# Patient Record
Sex: FEMALE | Race: WHITE | NOT HISPANIC OR LATINO | URBAN - NONMETROPOLITAN AREA
[De-identification: names, ages, dates, MRNs, and addresses within clinical notes are randomized per-mention and may not be internally consistent; named-entity substitution may affect disease eponyms.]

---

## 2019-02-20 NOTE — PATIENT DISCUSSION
The patient has developed an ACUTE HEMORRHAGIC PVD. No holes or tears were seen on clinical exam today. Reviewed the signs and symptoms of retinal tear/retinal detachment and the importance calling for prompt evaluation should there be increasing floaters, new flashing lights, or decreasing peripheral vision in either eye at any time. Close follow up is recommended due to higher risk or a tear in a hemorrhagic PVD.

## 2020-07-16 ENCOUNTER — IMPORTED ENCOUNTER (OUTPATIENT)
Dept: URBAN - NONMETROPOLITAN AREA CLINIC 1 | Facility: CLINIC | Age: 80
End: 2020-07-16

## 2020-07-16 PROBLEM — E11.9: Noted: 2020-07-16

## 2020-07-16 PROBLEM — Z96.1: Noted: 2020-07-16

## 2020-07-16 PROBLEM — H35.363: Noted: 2020-07-16

## 2020-07-16 PROCEDURE — 92250 FUNDUS PHOTOGRAPHY W/I&R: CPT

## 2020-07-16 PROCEDURE — 99204 OFFICE O/P NEW MOD 45 MIN: CPT

## 2020-07-16 NOTE — PATIENT DISCUSSION
NIDDM s  OU-Stressed the importance of keeping blood sugars under control blood pressure under control and weight normalization and regular visits with PCP. -Explained the possible effects of poorly controlled diabetes and the damage that diabetes can cause to ocular health. -Patient to check HgbA1C.-Pt instructed to contact our office with any vision changes. Pan-Macular Drusen OU - Recommend observation.- Recommend OCT-Mac in one year when patient returns. ***Recommend patient return for refraction

## 2020-07-29 ENCOUNTER — IMPORTED ENCOUNTER (OUTPATIENT)
Dept: URBAN - NONMETROPOLITAN AREA CLINIC 1 | Facility: CLINIC | Age: 80
End: 2020-07-29

## 2020-07-29 PROCEDURE — 92015 DETERMINE REFRACTIVE STATE: CPT

## 2020-11-12 NOTE — PATIENT DISCUSSION
[address] [address]  Phone: [phone]  Fax: [phone]    ANDIA Clarke CNP        November 12, 2020     Patient: Travis Guevara   YOB: 2006   Date of Visit: 11/12/2020       To Whom it May Concern:    James Jeong was seen in my clinic on 11/12/2020. He Is to be excused from work/school until COVID-19 results are finalized within the next 3-7 days. If you have any questions or concerns, please don't hesitate to call.     Sincerely,     NADIA Clarke CNP Cautioned may occur in other eye in future.

## 2021-07-20 ENCOUNTER — IMPORTED ENCOUNTER (OUTPATIENT)
Dept: URBAN - NONMETROPOLITAN AREA CLINIC 1 | Facility: CLINIC | Age: 81
End: 2021-07-20

## 2021-07-20 ENCOUNTER — PREPPED CHART (OUTPATIENT)
Dept: RURAL CLINIC 3 | Facility: CLINIC | Age: 81
End: 2021-07-20

## 2021-07-20 PROBLEM — H52.4: Noted: 2021-07-20

## 2021-07-20 PROBLEM — H35.3131: Noted: 2021-07-20

## 2021-07-20 PROBLEM — Z96.1: Noted: 2020-07-16

## 2021-07-20 PROBLEM — E11.9: Noted: 2020-07-16

## 2021-07-20 PROCEDURE — 92250 FUNDUS PHOTOGRAPHY W/I&R: CPT

## 2021-07-20 PROCEDURE — 92015 DETERMINE REFRACTIVE STATE: CPT

## 2021-07-20 PROCEDURE — 92014 COMPRE OPH EXAM EST PT 1/>: CPT

## 2021-07-20 NOTE — PATIENT DISCUSSION
NIDDM sans RetinopathyDiscussed diagnosis with patient. Discussed the risk of diabetic damage of the retina with potential vision loss and the importance of routine follow-up. Emphasized strict blood sugar control. Photos done today; no BDR OU. Continue to monitor. ARMD OUDiscussed diagnosis with patient. Discussed the chronic nature of this disease and limited treatment options. Recommend taking eye vitamins daily and monitoring vision for changes with [employer] Grid samples of Preservision given today. Continue to monitor. RTC in 6 months with OCT P/C IOL OUDiscussed diagnosis in detail with patient. Both intraocular implants in place and stable. Continue to monitor. Presbyopia OUDiscussed refracitve status in detail with patient. New glasses Rx given today. Continue to monitor.; 's Notes: MR  7/20/21DFE  7/20/21Photos  7/20/21OCT

## 2021-07-20 NOTE — PATIENT DISCUSSION
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 Discussed the risk of diabetic damage of the retina with potential vision loss and the importance of routine follow-up. Emphasized strict blood sugar control. Photos done today; no BDR OU. ARMD OU discussed diagnosis with patient. Discussed the chronic nature of this disease and limited treatment options. Recommend taking eye vitamins daily and monitoring vision for changes with Amsler Grid, samples of Preservision given today. RTC in 6 months with OCT. P/C IOL OU discussed diagnosis in detail with patient. Both intaocular implants in place and stable. Presbyopia OU discussed refractive status in detail with patient. New glasses rx given today.

## 2021-08-04 NOTE — PATIENT DISCUSSION
Does NOT APPEAR VISUALLY SIGNIFICANT. Plastic Surgeon Procedure Text (A): After obtaining clear surgical margins the patient was sent to plastics for surgical repair.  The patient understands they will receive post-surgical care and follow-up from the referring physician's office.

## 2022-01-23 ASSESSMENT — VISUAL ACUITY
OD_SC: 20/50
OS_SC: 20/50

## 2022-01-23 ASSESSMENT — TONOMETRY
OS_IOP_MMHG: 17
OD_IOP_MMHG: 17

## 2022-01-25 ENCOUNTER — FOLLOW UP (OUTPATIENT)
Dept: RURAL CLINIC 3 | Facility: CLINIC | Age: 82
End: 2022-01-25

## 2022-01-25 DIAGNOSIS — H35.3131: ICD-10-CM

## 2022-01-25 PROCEDURE — 92134 CPTRZ OPH DX IMG PST SGM RTA: CPT

## 2022-01-25 PROCEDURE — 99214 OFFICE O/P EST MOD 30 MIN: CPT

## 2022-01-25 ASSESSMENT — VISUAL ACUITY
OD_CC: 20/30-2
OS_CC: 20/25-1

## 2022-01-25 ASSESSMENT — TONOMETRY
OS_IOP_MMHG: 16
OD_IOP_MMHG: 16

## 2022-01-25 NOTE — PATIENT DISCUSSION
Discussed diagnosis with patient. Discussed the chronic nature of this disease and limited treatment options. Continue taking eye vitamins daily and monitoring vision for changes with Amsler Grid. OCT done today; no subretinal fluid OU. Continue to monitor. RTC in 6 months with Photos.

## 2022-01-25 NOTE — PATIENT DISCUSSION
Discussed diagnosis with patient. Discussed the risk of diabetic damage of the retina with potential vision loss and the importance of routine follow-up. Emphasized strict blood sugar control.

## 2022-04-16 ASSESSMENT — TONOMETRY
OD_IOP_MMHG: 16
OD_IOP_MMHG: 17
OS_IOP_MMHG: 17
OS_IOP_MMHG: 17

## 2022-04-16 ASSESSMENT — VISUAL ACUITY
OD_PH: 20/25+2
OD_CC: 20/50
OS_CC: 20/50
OS_PH: 20/20

## 2022-06-28 ENCOUNTER — FOLLOW UP (OUTPATIENT)
Dept: RURAL CLINIC 3 | Facility: CLINIC | Age: 82
End: 2022-06-28

## 2022-06-28 DIAGNOSIS — H35.3131: ICD-10-CM

## 2022-06-28 DIAGNOSIS — H52.4: ICD-10-CM

## 2022-06-28 PROCEDURE — 99214 OFFICE O/P EST MOD 30 MIN: CPT

## 2022-06-28 PROCEDURE — 92015 DETERMINE REFRACTIVE STATE: CPT

## 2022-06-28 PROCEDURE — 92250 FUNDUS PHOTOGRAPHY W/I&R: CPT

## 2022-06-28 ASSESSMENT — TONOMETRY
OS_IOP_MMHG: 11
OD_IOP_MMHG: 11

## 2022-06-28 ASSESSMENT — VISUAL ACUITY
OD_CC: 20/50-1
OS_CC: 20/25

## 2023-01-03 ENCOUNTER — FOLLOW UP (OUTPATIENT)
Dept: RURAL CLINIC 3 | Facility: CLINIC | Age: 83
End: 2023-01-03

## 2023-01-03 DIAGNOSIS — E11.9: ICD-10-CM

## 2023-01-03 DIAGNOSIS — H35.3131: ICD-10-CM

## 2023-01-03 PROCEDURE — 99214 OFFICE O/P EST MOD 30 MIN: CPT

## 2023-01-03 PROCEDURE — 92134 CPTRZ OPH DX IMG PST SGM RTA: CPT

## 2023-01-03 ASSESSMENT — VISUAL ACUITY
OS_SC: 20/60
OS_PH: 20/50
OD_PH: 20/50
OD_SC: 20/80

## 2023-01-03 ASSESSMENT — TONOMETRY
OD_IOP_MMHG: 15
OS_IOP_MMHG: 15

## 2023-07-05 ENCOUNTER — FOLLOW UP (OUTPATIENT)
Dept: RURAL CLINIC 3 | Facility: CLINIC | Age: 83
End: 2023-07-05

## 2023-07-05 DIAGNOSIS — H35.3131: ICD-10-CM

## 2023-07-05 DIAGNOSIS — E11.9: ICD-10-CM

## 2023-07-05 PROCEDURE — 92134 CPTRZ OPH DX IMG PST SGM RTA: CPT

## 2023-07-05 PROCEDURE — 99214 OFFICE O/P EST MOD 30 MIN: CPT

## 2023-07-05 ASSESSMENT — TONOMETRY
OD_IOP_MMHG: 16
OS_IOP_MMHG: 16

## 2023-07-05 ASSESSMENT — VISUAL ACUITY
OS_SC: 20/80
OD_PH: 20/50
OD_SC: 20/100
OS_PH: 20/50

## 2023-12-12 ENCOUNTER — EMERGENCY VISIT (OUTPATIENT)
Dept: RURAL CLINIC 3 | Facility: CLINIC | Age: 83
End: 2023-12-12

## 2023-12-12 DIAGNOSIS — H10.45: ICD-10-CM

## 2023-12-12 PROCEDURE — 99213 OFFICE O/P EST LOW 20 MIN: CPT

## 2023-12-12 ASSESSMENT — VISUAL ACUITY
OS_SC: 20/90
OD_PH: 20/40-2
OS_PH: 20/40
OU_SC: 20/90
OD_SC: 20/150

## 2023-12-12 ASSESSMENT — TONOMETRY
OS_IOP_MMHG: 14
OD_IOP_MMHG: 14

## 2024-01-09 ENCOUNTER — FOLLOW UP (OUTPATIENT)
Dept: RURAL CLINIC 3 | Facility: CLINIC | Age: 84
End: 2024-01-09

## 2024-01-09 DIAGNOSIS — H52.4: ICD-10-CM

## 2024-01-09 DIAGNOSIS — H35.3131: ICD-10-CM

## 2024-01-09 DIAGNOSIS — E11.9: ICD-10-CM

## 2024-01-09 PROCEDURE — 92250 FUNDUS PHOTOGRAPHY W/I&R: CPT

## 2024-01-09 PROCEDURE — 92014 COMPRE OPH EXAM EST PT 1/>: CPT

## 2024-01-09 PROCEDURE — 92015 DETERMINE REFRACTIVE STATE: CPT

## 2024-01-09 ASSESSMENT — VISUAL ACUITY
OS_PH: 20/40
OD_CC: 20/60
OS_CC: 20/50

## 2024-01-09 ASSESSMENT — TONOMETRY
OD_IOP_MMHG: 15
OS_IOP_MMHG: 15

## 2024-09-04 ENCOUNTER — FOLLOW UP (OUTPATIENT)
Dept: RURAL CLINIC 3 | Facility: CLINIC | Age: 84
End: 2024-09-04

## 2024-09-04 DIAGNOSIS — E11.9: ICD-10-CM

## 2024-09-04 DIAGNOSIS — H35.3131: ICD-10-CM

## 2024-09-04 DIAGNOSIS — Z96.1: ICD-10-CM

## 2024-09-04 PROCEDURE — 99214 OFFICE O/P EST MOD 30 MIN: CPT

## 2024-09-04 PROCEDURE — 92134 CPTRZ OPH DX IMG PST SGM RTA: CPT

## 2025-03-04 ENCOUNTER — FOLLOW UP (OUTPATIENT)
Age: 85
End: 2025-03-04

## 2025-03-04 DIAGNOSIS — H35.3121: ICD-10-CM

## 2025-03-04 DIAGNOSIS — H35.3112: ICD-10-CM

## 2025-03-04 DIAGNOSIS — Z96.1: ICD-10-CM

## 2025-03-04 DIAGNOSIS — H43.811: ICD-10-CM

## 2025-03-04 DIAGNOSIS — E11.9: ICD-10-CM

## 2025-03-04 DIAGNOSIS — H52.4: ICD-10-CM

## 2025-03-04 PROCEDURE — 92250 FUNDUS PHOTOGRAPHY W/I&R: CPT

## 2025-03-04 PROCEDURE — 92014 COMPRE OPH EXAM EST PT 1/>: CPT

## 2025-03-04 PROCEDURE — 92015 DETERMINE REFRACTIVE STATE: CPT
